# Patient Record
Sex: MALE | Race: BLACK OR AFRICAN AMERICAN | NOT HISPANIC OR LATINO | Employment: UNEMPLOYED | URBAN - METROPOLITAN AREA
[De-identification: names, ages, dates, MRNs, and addresses within clinical notes are randomized per-mention and may not be internally consistent; named-entity substitution may affect disease eponyms.]

---

## 2017-06-16 ENCOUNTER — APPOINTMENT (OUTPATIENT)
Dept: WOMENS IMAGING | Facility: HOSPITAL | Age: 27
End: 2017-06-16

## 2017-06-16 PROCEDURE — 73630 X-RAY EXAM OF FOOT: CPT | Performed by: RADIOLOGY

## 2019-06-20 ENCOUNTER — APPOINTMENT (OUTPATIENT)
Dept: GENERAL RADIOLOGY | Facility: HOSPITAL | Age: 29
End: 2019-06-20

## 2019-06-20 ENCOUNTER — HOSPITAL ENCOUNTER (EMERGENCY)
Facility: HOSPITAL | Age: 29
Discharge: HOME | End: 2019-06-20
Attending: EMERGENCY MEDICINE | Admitting: EMERGENCY MEDICINE

## 2019-06-20 VITALS
HEIGHT: 74 IN | BODY MASS INDEX: 24.81 KG/M2 | RESPIRATION RATE: 16 BRPM | DIASTOLIC BLOOD PRESSURE: 99 MMHG | OXYGEN SATURATION: 99 % | HEART RATE: 52 BPM | WEIGHT: 193.34 LBS | TEMPERATURE: 97.7 F | SYSTOLIC BLOOD PRESSURE: 135 MMHG

## 2019-06-20 DIAGNOSIS — S60.351D FOREIGN BODY OF RIGHT THUMB, SUBSEQUENT ENCOUNTER: ICD-10-CM

## 2019-06-20 DIAGNOSIS — M79.644 THUMB PAIN, RIGHT: Primary | ICD-10-CM

## 2019-06-20 PROCEDURE — 73130 X-RAY EXAM OF HAND: CPT

## 2019-06-20 PROCEDURE — 99283 EMERGENCY DEPT VISIT LOW MDM: CPT

## 2019-06-20 RX ORDER — CEPHALEXIN 500 MG/1
500 CAPSULE ORAL 3 TIMES DAILY
Qty: 12 CAPSULE | Refills: 0 | Status: SHIPPED | OUTPATIENT
Start: 2019-06-20 | End: 2019-06-24

## 2020-01-23 ENCOUNTER — APPOINTMENT (OUTPATIENT)
Dept: GENERAL RADIOLOGY | Facility: HOSPITAL | Age: 30
End: 2020-01-23

## 2020-01-23 ENCOUNTER — HOSPITAL ENCOUNTER (EMERGENCY)
Facility: HOSPITAL | Age: 30
Discharge: HOME | End: 2020-01-23
Admitting: EMERGENCY MEDICINE

## 2020-01-23 VITALS
TEMPERATURE: 98.8 F | BODY MASS INDEX: 26.26 KG/M2 | DIASTOLIC BLOOD PRESSURE: 76 MMHG | SYSTOLIC BLOOD PRESSURE: 131 MMHG | WEIGHT: 204.59 LBS | HEIGHT: 74 IN | OXYGEN SATURATION: 98 % | RESPIRATION RATE: 18 BRPM | HEART RATE: 76 BPM

## 2020-01-23 DIAGNOSIS — R50.9 FEVER, UNSPECIFIED FEVER CAUSE: ICD-10-CM

## 2020-01-23 DIAGNOSIS — J06.9 UPPER RESPIRATORY TRACT INFECTION, UNSPECIFIED TYPE: Primary | ICD-10-CM

## 2020-01-23 LAB
FLUAV SUBTYP SPEC NAA+PROBE: NOT DETECTED
FLUBV RNA ISLT QL NAA+PROBE: NOT DETECTED
S PYO AG THROAT QL: NEGATIVE

## 2020-01-23 PROCEDURE — 87502 INFLUENZA DNA AMP PROBE: CPT | Performed by: NURSE PRACTITIONER

## 2020-01-23 PROCEDURE — 99283 EMERGENCY DEPT VISIT LOW MDM: CPT

## 2020-01-23 PROCEDURE — 94640 AIRWAY INHALATION TREATMENT: CPT

## 2020-01-23 PROCEDURE — 87651 STREP A DNA AMP PROBE: CPT | Performed by: NURSE PRACTITIONER

## 2020-01-23 PROCEDURE — 71046 X-RAY EXAM CHEST 2 VIEWS: CPT

## 2020-01-23 RX ORDER — ALBUTEROL SULFATE 90 UG/1
2 AEROSOL, METERED RESPIRATORY (INHALATION) EVERY 6 HOURS PRN
Qty: 6.7 G | Refills: 0 | OUTPATIENT
Start: 2020-01-23 | End: 2022-08-09

## 2020-01-23 RX ORDER — IBUPROFEN 800 MG/1
800 TABLET ORAL EVERY 8 HOURS PRN
Qty: 9 TABLET | Refills: 0 | OUTPATIENT
Start: 2020-01-23 | End: 2022-08-09

## 2020-01-23 RX ORDER — IPRATROPIUM BROMIDE AND ALBUTEROL SULFATE 2.5; .5 MG/3ML; MG/3ML
3 SOLUTION RESPIRATORY (INHALATION) ONCE
Status: COMPLETED | OUTPATIENT
Start: 2020-01-23 | End: 2020-01-23

## 2020-01-23 RX ORDER — IPRATROPIUM BROMIDE AND ALBUTEROL SULFATE 2.5; .5 MG/3ML; MG/3ML
3 SOLUTION RESPIRATORY (INHALATION) EVERY 4 HOURS PRN
Qty: 90 ML | Refills: 0 | OUTPATIENT
Start: 2020-01-23 | End: 2022-08-09

## 2020-01-23 RX ORDER — BENZONATATE 100 MG/1
100 CAPSULE ORAL 3 TIMES DAILY PRN
Qty: 30 CAPSULE | Refills: 0 | OUTPATIENT
Start: 2020-01-23 | End: 2022-08-09

## 2020-01-23 RX ADMIN — IPRATROPIUM BROMIDE AND ALBUTEROL SULFATE 3 ML: .5; 3 SOLUTION RESPIRATORY (INHALATION) at 22:50

## 2020-01-24 NOTE — DISCHARGE INSTRUCTIONS
medications as directed. plenty of fluids. Tylenol as needed.  Follow up with your family doctor the [facility] next week.  Return for any new or worsening symptoms. May use warm saltwater gargles or Chloraseptic spray throat lozenges for sore throat. May use saline rinses or neti pots for congestion

## 2020-01-24 NOTE — ED NOTES
Pt c/o body aches, vomiting, fever (yesterday 101.2), sore throat, running nose and congestion since Tuesday evening.        Niurka Merida, JUAN JOSE  01/23/20 2416

## 2020-01-24 NOTE — ED PROVIDER NOTES
Subjective   Chief complaint: Congestion      Context: Patient is a 29-year-old male who comes in complaining of cough and congestion for the last couple days.  He states he had a fever yesterday.  Has had postnasal drainage.  Denies any vomiting diarrhea rash or stiff neck.  Has also had a sore throat.  Denies any decrease in intake output vomiting or diarrhea.    Duration: Couple days    Timing: Waxes and wanes    Severity: Moderate    Associated symptoms: Ran out of his albuterol solution a couple days ago          PCP:  None            Review of Systems   Constitutional: Positive for fever.   HENT: Positive for congestion, postnasal drip, rhinorrhea and sore throat.    Eyes: Negative.    Respiratory: Positive for cough and wheezing.    Cardiovascular: Negative.    Gastrointestinal: Negative.    Genitourinary: Negative.    Musculoskeletal: Negative.    Skin: Negative.    Hematological: Negative.        Past Medical History:   Diagnosis Date   • Asthma        No Known Allergies    No past surgical history on file.    No family history on file.    Social History     Socioeconomic History   • Marital status: Single     Spouse name: Not on file   • Number of children: Not on file   • Years of education: Not on file   • Highest education level: Not on file   Tobacco Use   • Smoking status: Never Smoker   • Smokeless tobacco: Never Used   Substance and Sexual Activity   • Alcohol use: Yes     Alcohol/week: 12.0 standard drinks     Types: 12 Cans of beer per week   • Drug use: No   • Sexual activity: Defer           Objective   Physical Exam     Vital signs and triage nurse note reviewed.   Constitutional: Awake, alert; well-developed and well-nourished. No acute distress is noted.   HEENT: Normocephalic, atraumatic; pupils are PERRL with intact EOM; oropharynx is pink and moist without exudate or erythema. Mild congestion with clear rhinorrhea.  No change in phonation difficulty nor secretions or speaking full  sentences.  No pain over the frontal or maxillary sinuses.  Neck: Supple, full range of motion without pain; no cervical lymphadenopathy.   Cardiovascular: Regular rate and rhythm, normal S1-S2.   Pulmonary: Respiratory effort regular nonlabored, breath sounds expiratory wheezes bilateral lower lobes   Abdomen: Soft, nontender nondistended with normoactive bowel sounds; no rebound or guarding.   Musculoskeletal: Independent range of motion of all extremities with no palpable tenderness or edema.   Neuro: Alert oriented x3, speech is clear and appropriate, GCS 15   Skin:  Fleshtone warm, dry, intact; no erythematous or petechial rash or lesion       Procedures           ED Course      Labs Reviewed   INFLUENZA ANTIGEN, RAPID - Normal   RAPID STREP A SCREEN - Normal     Medications   ipratropium-albuterol (DUO-NEB) nebulizer solution 3 mL (3 mL Nebulization Given 1/23/20 2250)     Xr Chest 2 View    Result Date: 1/23/2020  Impression:  1. No evidence of active disease.  Electronically Signed ByGreg Salas On:1/23/2020 11:25 PM This report was finalized on [id number] by  April Salas .                                             MDM  Number of Diagnoses or Management Options  Fever, unspecified fever cause:   Upper respiratory tract infection, unspecified type:   Diagnosis management comments: Medical decision  Comorbidities:  has a past medical history of Asthma.  Differentials: Flu strep pneumonia virus  Radiology interpretation:  X-rays reviewed by me and interpreted by radiologist,   Xr Chest 2 View    Result Date: 1/23/2020  Impression:  1. No evidence of active disease.  Electronically Signed ByGreg Salas On:1/23/2020 11:25 PM This report was finalized on [id number] by  April Salas .    Lab interpretation:  Labs viewed by me significant for, negative  i discussed findings with patient who voices understanding of discharge instructions, signs and symptoms requiring return to ED; discharged improved  and in stable condition with follow up for re-evaluation.  Charged home with Tessalon and pro-air ibuprofen and DuoNeb solution      Final diagnoses:   Upper respiratory tract infection, unspecified type   Fever, unspecified fever cause            Andreea Howell, APRN  01/23/20 4359

## 2020-07-31 ENCOUNTER — HOSPITAL ENCOUNTER (EMERGENCY)
Facility: HOSPITAL | Age: 30
Discharge: HOME | End: 2020-07-31
Admitting: EMERGENCY MEDICINE

## 2020-07-31 VITALS
SYSTOLIC BLOOD PRESSURE: 117 MMHG | HEIGHT: 74 IN | DIASTOLIC BLOOD PRESSURE: 67 MMHG | BODY MASS INDEX: 27.72 KG/M2 | TEMPERATURE: 98.2 F | RESPIRATION RATE: 15 BRPM | WEIGHT: 216 LBS | OXYGEN SATURATION: 99 % | HEART RATE: 79 BPM

## 2020-07-31 DIAGNOSIS — R31.9 URINARY TRACT INFECTION WITH HEMATURIA, SITE UNSPECIFIED: ICD-10-CM

## 2020-07-31 DIAGNOSIS — N39.0 URINARY TRACT INFECTION WITH HEMATURIA, SITE UNSPECIFIED: ICD-10-CM

## 2020-07-31 DIAGNOSIS — R11.2 NAUSEA AND VOMITING, INTRACTABILITY OF VOMITING NOT SPECIFIED, UNSPECIFIED VOMITING TYPE: Primary | ICD-10-CM

## 2020-07-31 LAB
ALBUMIN SERPL-MCNC: 4.5 G/DL (ref 3.5–5.2)
ALBUMIN/GLOB SERPL: 1.5 G/DL
ALP SERPL-CCNC: 69 U/L (ref 39–117)
ALT SERPL W P-5'-P-CCNC: 35 U/L (ref 1–41)
ANION GAP SERPL CALCULATED.3IONS-SCNC: 13 MMOL/L (ref 5–15)
AST SERPL-CCNC: 32 U/L (ref 1–40)
BACTERIA UR QL AUTO: ABNORMAL /HPF
BASOPHILS # BLD AUTO: 0 10*3/MM3 (ref 0–0.2)
BASOPHILS NFR BLD AUTO: 0.6 % (ref 0–1.5)
BILIRUB SERPL-MCNC: 0.4 MG/DL (ref 0–1.2)
BILIRUB UR QL STRIP: NEGATIVE
BUN SERPL-MCNC: 4 MG/DL (ref 6–20)
BUN SERPL-MCNC: NORMAL MG/DL
BUN/CREAT SERPL: NORMAL
CALCIUM SPEC-SCNC: 9.2 MG/DL (ref 8.6–10.5)
CHLORIDE SERPL-SCNC: 104 MMOL/L (ref 98–107)
CLARITY UR: CLEAR
CO2 SERPL-SCNC: 24 MMOL/L (ref 22–29)
COLOR UR: YELLOW
CREAT SERPL-MCNC: 1.27 MG/DL (ref 0.76–1.27)
DEPRECATED RDW RBC AUTO: 42.4 FL (ref 37–54)
EOSINOPHIL # BLD AUTO: 0.3 10*3/MM3 (ref 0–0.4)
EOSINOPHIL NFR BLD AUTO: 3.5 % (ref 0.3–6.2)
ERYTHROCYTE [DISTWIDTH] IN BLOOD BY AUTOMATED COUNT: 14 % (ref 12.3–15.4)
GFR SERPL CREATININE-BSD FRML MDRD: 80 ML/MIN/1.73
GLOBULIN UR ELPH-MCNC: 3.1 GM/DL
GLUCOSE SERPL-MCNC: 95 MG/DL (ref 65–99)
GLUCOSE UR STRIP-MCNC: NEGATIVE MG/DL
HCT VFR BLD AUTO: 45.5 % (ref 37.5–51)
HGB BLD-MCNC: 15 G/DL (ref 13–17.7)
HGB UR QL STRIP.AUTO: ABNORMAL
HYALINE CASTS UR QL AUTO: ABNORMAL /LPF
KETONES UR QL STRIP: NEGATIVE
LEUKOCYTE ESTERASE UR QL STRIP.AUTO: ABNORMAL
LIPASE SERPL-CCNC: 119 U/L (ref 13–60)
LYMPHOCYTES # BLD AUTO: 2.8 10*3/MM3 (ref 0.7–3.1)
LYMPHOCYTES NFR BLD AUTO: 38.7 % (ref 19.6–45.3)
MCH RBC QN AUTO: 29 PG (ref 26.6–33)
MCHC RBC AUTO-ENTMCNC: 32.9 G/DL (ref 31.5–35.7)
MCV RBC AUTO: 88.2 FL (ref 79–97)
MONOCYTES # BLD AUTO: 0.6 10*3/MM3 (ref 0.1–0.9)
MONOCYTES NFR BLD AUTO: 8.8 % (ref 5–12)
NEUTROPHILS NFR BLD AUTO: 3.5 10*3/MM3 (ref 1.7–7)
NEUTROPHILS NFR BLD AUTO: 48.4 % (ref 42.7–76)
NITRITE UR QL STRIP: NEGATIVE
NRBC BLD AUTO-RTO: 0.2 /100 WBC (ref 0–0.2)
PH UR STRIP.AUTO: 6 [PH] (ref 5–8)
PLATELET # BLD AUTO: 298 10*3/MM3 (ref 140–450)
PMV BLD AUTO: 8.5 FL (ref 6–12)
POTASSIUM SERPL-SCNC: 4.6 MMOL/L (ref 3.5–5.2)
PROT SERPL-MCNC: 7.6 G/DL (ref 6–8.5)
PROT UR QL STRIP: NEGATIVE
RBC # BLD AUTO: 5.16 10*6/MM3 (ref 4.14–5.8)
RBC # UR: ABNORMAL /HPF
REF LAB TEST METHOD: ABNORMAL
SODIUM SERPL-SCNC: 141 MMOL/L (ref 136–145)
SP GR UR STRIP: 1.02 (ref 1–1.03)
SQUAMOUS #/AREA URNS HPF: ABNORMAL /HPF
UROBILINOGEN UR QL STRIP: ABNORMAL
WBC # BLD AUTO: 7.1 10*3/MM3 (ref 3.4–10.8)
WBC UR QL AUTO: ABNORMAL /HPF

## 2020-07-31 PROCEDURE — 99283 EMERGENCY DEPT VISIT LOW MDM: CPT

## 2020-07-31 PROCEDURE — 81001 URINALYSIS AUTO W/SCOPE: CPT | Performed by: PHYSICIAN ASSISTANT

## 2020-07-31 PROCEDURE — 96374 THER/PROPH/DIAG INJ IV PUSH: CPT

## 2020-07-31 PROCEDURE — 85025 COMPLETE CBC W/AUTO DIFF WBC: CPT | Performed by: PHYSICIAN ASSISTANT

## 2020-07-31 PROCEDURE — 80053 COMPREHEN METABOLIC PANEL: CPT | Performed by: PHYSICIAN ASSISTANT

## 2020-07-31 PROCEDURE — 25010000002 ONDANSETRON PER 1 MG: Performed by: PHYSICIAN ASSISTANT

## 2020-07-31 PROCEDURE — 87086 URINE CULTURE/COLONY COUNT: CPT | Performed by: PHYSICIAN ASSISTANT

## 2020-07-31 PROCEDURE — 83690 ASSAY OF LIPASE: CPT | Performed by: PHYSICIAN ASSISTANT

## 2020-07-31 PROCEDURE — 96361 HYDRATE IV INFUSION ADD-ON: CPT

## 2020-07-31 RX ORDER — ONDANSETRON 4 MG/1
4 TABLET, ORALLY DISINTEGRATING ORAL EVERY 4 HOURS PRN
Qty: 10 TABLET | Refills: 0 | OUTPATIENT
Start: 2020-07-31 | End: 2022-08-09

## 2020-07-31 RX ORDER — CEFDINIR 300 MG/1
300 CAPSULE ORAL 2 TIMES DAILY
Qty: 14 CAPSULE | Refills: 0 | OUTPATIENT
Start: 2020-07-31 | End: 2022-08-09

## 2020-07-31 RX ORDER — SODIUM CHLORIDE 0.9 % (FLUSH) 0.9 %
10 SYRINGE (ML) INJECTION AS NEEDED
Status: DISCONTINUED | OUTPATIENT
Start: 2020-07-31 | End: 2020-07-31 | Stop reason: HOSPADM

## 2020-07-31 RX ORDER — ONDANSETRON 2 MG/ML
4 INJECTION INTRAMUSCULAR; INTRAVENOUS ONCE
Status: COMPLETED | OUTPATIENT
Start: 2020-07-31 | End: 2020-07-31

## 2020-07-31 RX ADMIN — ONDANSETRON 4 MG: 2 INJECTION INTRAMUSCULAR; INTRAVENOUS at 14:46

## 2020-07-31 RX ADMIN — SODIUM CHLORIDE 1000 ML: 900 INJECTION, SOLUTION INTRAVENOUS at 14:47

## 2020-08-01 LAB — BACTERIA SPEC AEROBE CULT: NO GROWTH

## 2020-09-21 ENCOUNTER — APPOINTMENT (OUTPATIENT)
Dept: GENERAL RADIOLOGY | Facility: HOSPITAL | Age: 30
End: 2020-09-21

## 2020-09-21 ENCOUNTER — HOSPITAL ENCOUNTER (EMERGENCY)
Facility: HOSPITAL | Age: 30
Discharge: HOME | End: 2020-09-21
Admitting: EMERGENCY MEDICINE

## 2020-09-21 VITALS
HEART RATE: 91 BPM | OXYGEN SATURATION: 99 % | WEIGHT: 220 LBS | BODY MASS INDEX: 28.23 KG/M2 | HEIGHT: 74 IN | RESPIRATION RATE: 16 BRPM | TEMPERATURE: 98 F | DIASTOLIC BLOOD PRESSURE: 69 MMHG | SYSTOLIC BLOOD PRESSURE: 129 MMHG

## 2020-09-21 DIAGNOSIS — R93.7 ABNORMAL BONE XRAY: ICD-10-CM

## 2020-09-21 DIAGNOSIS — M25.572 ACUTE LEFT ANKLE PAIN: Primary | ICD-10-CM

## 2020-09-21 PROCEDURE — 73610 X-RAY EXAM OF ANKLE: CPT

## 2020-09-21 PROCEDURE — 99283 EMERGENCY DEPT VISIT LOW MDM: CPT

## 2020-09-21 RX ORDER — HYDROCODONE BITARTRATE AND ACETAMINOPHEN 7.5; 325 MG/1; MG/1
1 TABLET ORAL ONCE
Status: COMPLETED | OUTPATIENT
Start: 2020-09-21 | End: 2020-09-21

## 2020-09-21 RX ORDER — TRAMADOL HYDROCHLORIDE 50 MG/1
50 TABLET ORAL EVERY 6 HOURS PRN
Qty: 11 TABLET | Refills: 0 | OUTPATIENT
Start: 2020-09-21 | End: 2022-08-09

## 2020-09-21 RX ADMIN — HYDROCODONE BITARTRATE AND ACETAMINOPHEN 1 TABLET: 7.5; 325 TABLET ORAL at 09:24

## 2020-09-21 NOTE — DISCHARGE INSTRUCTIONS
Discharge home.  May use ice 20 minutes at a time several times a day for discomfort and swelling also keep ankle elevated at level of heart to help with swelling.  Use walking boot and crutches until seen by Orthopaedic doctor, Dr. Bermudez.  May take tramadol for pain as needed.  Do not drink drive or operate heavy machinery with pain medication.

## 2020-09-21 NOTE — ED NOTES
C/O left ankle pain after playing basketball lastnight, felt a pop while playing. Pt reports he is unable to put weight on left foot.      Lisa Aldrich RN  09/21/20 1491

## 2020-09-21 NOTE — ED PROVIDER NOTES
Subjective   History: Patient is a 30-year-old male complains of left ankle pain.  He states yesterday he was playing basketball when he heard a pop to his left ankle.  Reports he was able to walk yesterday but then woke up this a.m. unable to bear weight on his left ankle.  States pain goes up his leg towards his calf.  Denies taking any pain medication for ankle pain.      Onset: 1 day  Location: Left ankle  Duration: Waxes and wanes  Character: Sharp and ache  Aggravating/Alleviating factors: Walking makes it worse  Radiation left calf  Severity: Moderate            Review of Systems   Constitutional: Negative for fever.   Respiratory: Negative for shortness of breath.    Cardiovascular: Negative for chest pain.   Gastrointestinal: Negative for nausea and vomiting.   Musculoskeletal: Positive for arthralgias and joint swelling.   Skin: Negative for color change, rash and wound.   Psychiatric/Behavioral: Negative for self-injury.       Past Medical History:   Diagnosis Date   • Asthma        No Known Allergies    No past surgical history on file.    No family history on file.    Social History     Socioeconomic History   • Marital status: Single     Spouse name: Not on file   • Number of children: Not on file   • Years of education: Not on file   • Highest education level: Not on file   Tobacco Use   • Smoking status: Never Smoker   • Smokeless tobacco: Never Used   Substance and Sexual Activity   • Alcohol use: Yes     Alcohol/week: 12.0 standard drinks     Types: 12 Cans of beer per week   • Drug use: No   • Sexual activity: Defer           Objective   Physical Exam  Constitutional:       Appearance: Normal appearance.   HENT:      Head: Normocephalic.   Eyes:      Pupils: Pupils are equal, round, and reactive to light.   Cardiovascular:      Rate and Rhythm: Normal rate.      Heart sounds: Normal heart sounds. No murmur.   Pulmonary:      Effort: Pulmonary effort is normal.      Breath sounds: Normal breath  "sounds.   Musculoskeletal:         General: Swelling and tenderness present. No deformity.      Comments: Mild edema left foot and ankle, 2+ dorsalis pedis pulse palpable.  Positive left Blake test and tender to palpation over Achilles region.  Range of motion to left ankle limited due to pain.  House arrest bracelet present to left ankle.  No deformity or crepitus no erythema warmth ecchymosis or open wounds present.   Skin:     General: Skin is warm and dry.      Capillary Refill: Capillary refill takes less than 2 seconds.      Findings: No bruising, erythema or rash.   Neurological:      Mental Status: He is alert and oriented to person, place, and time.   Psychiatric:         Mood and Affect: Mood normal.         Behavior: Behavior normal.         Thought Content: Thought content normal.         Judgment: Judgment normal.         Procedures           ED Course    /81 (Patient Position: Sitting)   Pulse 88   Temp 97.9 °F (36.6 °C) (Oral)   Resp 14   Ht 188 cm (74\")   Wt 99.8 kg (220 lb)   SpO2 97%   BMI 28.25 kg/m²   Labs Reviewed - No data to display  Medications   HYDROcodone-acetaminophen (NORCO) 7.5-325 MG per tablet 1 tablet (1 tablet Oral Given 9/21/20 0924)     Xr Ankle 3+ View Left    Result Date: 9/21/2020  1. No acute osseous abnormality. 2. Small well-corticated osseous body adjacent to the tip of the medial malleolus likely related to remote trauma.  Electronically Signed By-Ramin Kearns On:9/21/2020 9:38 AM This report was finalized on [id number] by  Ramin Kearns, .                                               [facility]     Inspect queried.  Walking boot and crutches given to patient upon discharge.  Small osseous fragment noted on ankle x-ray likely related to recent trauma without acute fracture or dislocation.  Patient had positive Blake test with edema to ankle needs Ortho consult to rule out partial or complete tear of Achilles tendon.  On-call orthopedic Dr. Bermudez name " and number given to patient on discharge.    Patient had house arrest placed to left ankle, [facility]'s department was contacted and approval given to remove ankle bracelet but to remain with patient on his person until another bracelet is able to be placed ASAP.  Removal of bracelet was needed for placement of walking boot.    Prescription: Tramadol    Final diagnoses:   Acute left ankle pain   Abnormal bone xray            Adriana Wheatley, APRN  09/21/20 1049

## 2022-08-09 ENCOUNTER — HOSPITAL ENCOUNTER (EMERGENCY)
Facility: HOSPITAL | Age: 32
Discharge: HOME | End: 2022-08-09
Attending: EMERGENCY MEDICINE | Admitting: EMERGENCY MEDICINE

## 2022-08-09 VITALS
SYSTOLIC BLOOD PRESSURE: 132 MMHG | TEMPERATURE: 98.8 F | WEIGHT: 204.2 LBS | HEIGHT: 74 IN | RESPIRATION RATE: 18 BRPM | HEART RATE: 108 BPM | BODY MASS INDEX: 26.21 KG/M2 | DIASTOLIC BLOOD PRESSURE: 81 MMHG | OXYGEN SATURATION: 99 %

## 2022-08-09 DIAGNOSIS — M25.50 ARTHRALGIA, UNSPECIFIED JOINT: ICD-10-CM

## 2022-08-09 DIAGNOSIS — U07.1 COVID: Primary | ICD-10-CM

## 2022-08-09 LAB
B PARAPERT DNA SPEC QL NAA+PROBE: NOT DETECTED
B PERT DNA SPEC QL NAA+PROBE: NOT DETECTED
C PNEUM DNA NPH QL NAA+NON-PROBE: NOT DETECTED
FLUAV SUBTYP SPEC NAA+PROBE: NOT DETECTED
FLUBV RNA ISLT QL NAA+PROBE: NOT DETECTED
HADV DNA SPEC NAA+PROBE: NOT DETECTED
HCOV 229E RNA SPEC QL NAA+PROBE: NOT DETECTED
HCOV HKU1 RNA SPEC QL NAA+PROBE: NOT DETECTED
HCOV NL63 RNA SPEC QL NAA+PROBE: NOT DETECTED
HCOV OC43 RNA SPEC QL NAA+PROBE: NOT DETECTED
HMPV RNA NPH QL NAA+NON-PROBE: NOT DETECTED
HPIV1 RNA ISLT QL NAA+PROBE: NOT DETECTED
HPIV2 RNA SPEC QL NAA+PROBE: NOT DETECTED
HPIV3 RNA NPH QL NAA+PROBE: NOT DETECTED
HPIV4 P GENE NPH QL NAA+PROBE: NOT DETECTED
M PNEUMO IGG SER IA-ACNC: NOT DETECTED
RHINOVIRUS RNA SPEC NAA+PROBE: NOT DETECTED
RSV RNA NPH QL NAA+NON-PROBE: NOT DETECTED
SARS-COV-2 RNA NPH QL NAA+NON-PROBE: DETECTED

## 2022-08-09 PROCEDURE — 0202U NFCT DS 22 TRGT SARS-COV-2: CPT | Performed by: EMERGENCY MEDICINE

## 2022-08-09 PROCEDURE — 99283 EMERGENCY DEPT VISIT LOW MDM: CPT

## 2022-08-09 RX ORDER — METHOCARBAMOL 500 MG/1
500 TABLET, FILM COATED ORAL ONCE
Status: COMPLETED | OUTPATIENT
Start: 2022-08-09 | End: 2022-08-09

## 2022-08-09 RX ORDER — ACETAMINOPHEN 500 MG
1000 TABLET ORAL ONCE
Status: COMPLETED | OUTPATIENT
Start: 2022-08-09 | End: 2022-08-09

## 2022-08-09 RX ORDER — METHOCARBAMOL 500 MG/1
500 TABLET, FILM COATED ORAL 4 TIMES DAILY
Qty: 20 TABLET | Refills: 0 | Status: SHIPPED | OUTPATIENT
Start: 2022-08-09

## 2022-08-09 RX ORDER — IBUPROFEN 600 MG/1
600 TABLET ORAL ONCE
Status: COMPLETED | OUTPATIENT
Start: 2022-08-09 | End: 2022-08-09

## 2022-08-09 RX ORDER — DICLOFENAC SODIUM 75 MG/1
75 TABLET, DELAYED RELEASE ORAL 2 TIMES DAILY PRN
Qty: 20 TABLET | Refills: 0 | Status: SHIPPED | OUTPATIENT
Start: 2022-08-09

## 2022-08-09 RX ADMIN — IBUPROFEN 600 MG: 600 TABLET ORAL at 05:23

## 2022-08-09 RX ADMIN — METHOCARBAMOL 500 MG: 500 TABLET ORAL at 05:23

## 2022-08-09 RX ADMIN — ACETAMINOPHEN 1000 MG: 500 TABLET ORAL at 05:23

## 2022-08-09 NOTE — ED PROVIDER NOTES
Subjective   Patient is a 31-year-old man who began feeling bad yesterday he states he has body aches and pains he has had no fever no chills he states the pain is concentrated in his low back.  He did not take anything for discomfort he has had no COVID exposure that he is aware of.  He rates his pain a 7/10 he states it is a dull aching pain that hurts all over but mainly in his low back.  He has had no injury no falls he has no loss of bowel or bladder control no saddle anesthesia no difficulty urinating          Review of Systems   Constitutional: Negative for chills, fatigue and fever.   HENT: Negative for congestion, tinnitus and trouble swallowing.    Eyes: Negative for photophobia, discharge and redness.   Respiratory: Negative for cough and shortness of breath.    Cardiovascular: Negative for chest pain and palpitations.   Gastrointestinal: Negative for abdominal pain, diarrhea, nausea and vomiting.   Genitourinary: Negative for dysuria, frequency and urgency.   Musculoskeletal: Positive for arthralgias and back pain. Negative for joint swelling and myalgias.   Skin: Negative for rash.   Neurological: Negative for dizziness and headaches.   Psychiatric/Behavioral: Negative for confusion.   All other systems reviewed and are negative.      Past Medical History:   Diagnosis Date   • Asthma        No Known Allergies    History reviewed. No pertinent surgical history.    History reviewed. No pertinent family history.    Social History     Socioeconomic History   • Marital status: Single   Tobacco Use   • Smoking status: Never Smoker   • Smokeless tobacco: Never Used   Substance and Sexual Activity   • Alcohol use: Yes     Alcohol/week: 12.0 standard drinks     Types: 12 Cans of beer per week   • Drug use: No   • Sexual activity: Defer           Objective   Physical Exam  Vitals reviewed.   Constitutional:       General: He is not in acute distress.     Appearance: Normal appearance. He is well-developed. He is  "obese. He is not ill-appearing, toxic-appearing or diaphoretic.   HENT:      Head: Normocephalic and atraumatic.      Right Ear: External ear normal.      Left Ear: External ear normal.      Nose: Nose normal.      Mouth/Throat:      Mouth: Mucous membranes are moist.   Eyes:      Conjunctiva/sclera: Conjunctivae normal.      Pupils: Pupils are equal, round, and reactive to light.   Cardiovascular:      Rate and Rhythm: Regular rhythm. Tachycardia present.      Pulses: Normal pulses.      Heart sounds: Normal heart sounds.   Pulmonary:      Effort: Pulmonary effort is normal.      Breath sounds: Normal breath sounds.   Abdominal:      General: Bowel sounds are normal.      Palpations: Abdomen is soft.   Musculoskeletal:         General: Normal range of motion.      Cervical back: Normal range of motion. Rigidity present. No tenderness.   Skin:     General: Skin is warm and dry.      Capillary Refill: Capillary refill takes less than 2 seconds.   Neurological:      General: No focal deficit present.      Mental Status: He is alert and oriented to person, place, and time.      GCS: GCS eye subscore is 4. GCS verbal subscore is 5. GCS motor subscore is 6.      Cranial Nerves: Cranial nerves are intact.      Motor: Motor function is intact.      Coordination: Coordination is intact.      Deep Tendon Reflexes: Reflexes are normal and symmetric.   Psychiatric:         Attention and Perception: Attention and perception normal.         Mood and Affect: Mood and affect normal.         Speech: Speech normal.         Behavior: Behavior normal. Behavior is cooperative.         Cognition and Memory: Cognition and memory normal.         Procedures           ED Course      /82 (BP Location: Left arm, Patient Position: Sitting)   Pulse 107   Temp 99 °F (37.2 °C)   Resp 17   Ht 188 cm (74\")   Wt 92.6 kg (204 lb 3.2 oz)   SpO2 97%   BMI 26.22 kg/m²   Labs Reviewed   RESPIRATORY PANEL PCR W/ COVID-19 (SARS-COV-2) " PAUL/MARCELLUS/JAYJAY/PAD/COR/MAD/ELENA IN-HOUSE, NP SWAB IN UTM/VTP, 3-4 HR TAT - Abnormal; Notable for the following components:       Result Value    COVID19 Detected (*)     All other components within normal limits    Narrative:     In the setting of a positive respiratory panel with a viral infection PLUS a negative procalcitonin without other underlying concern for bacterial infection, consider observing off antibiotics or discontinuation of antibiotics and continue supportive care. If the respiratory panel is positive for atypical bacterial infection (Bordetella pertussis, Chlamydophila pneumoniae, or Mycoplasma pneumoniae), consider antibiotic de-escalation to target atypical bacterial infection.   COVID-19,CEPHEID/ÁNGELA,COR/JAYJAY/PAD/THIERRY IN-HOUSE,NP SWAB IN TRANSPORT MEDIA 3-4 HR TAT, RT-PCR     Medications   ibuprofen (ADVIL,MOTRIN) tablet 600 mg (has no administration in time range)   acetaminophen (TYLENOL) tablet 1,000 mg (has no administration in time range)   methocarbamol (ROBAXIN) tablet 500 mg (has no administration in time range)     No radiology results for the last day                                       MDM  Number of Diagnoses or Management Options  Arthralgia, unspecified joint  COVID  Diagnosis management comments: Patient had above exam and was given Tylenol Motrin and Robaxin for discomfort.  He was identified as having COVID-19.  He was told to quarantine for the next 5 days he was given Voltaren for discomfort he was advised not to mix with Motrin ibuprofen Advil or Aleve    He was advised to return if worse he stated he did not need a work note.       Amount and/or Complexity of Data Reviewed  Clinical lab tests: reviewed    Risk of Complications, Morbidity, and/or Mortality  Presenting problems: high  Diagnostic procedures: high  Management options: high    Patient Progress  Patient progress: improved      Final diagnoses:   COVID   Arthralgia, unspecified joint       ED Disposition  ED  Disposition     ED Disposition   Discharge    Condition   Stable    Comment   --             Hayden Madsen MD  [address]  [place] [address]  [phone]    In 5 days      Matthew Lilly MD  [address] [facility] [place] [address]  [phone]    In 3 days  If symptoms worsen, As needed         Medication List      New Prescriptions    diclofenac 75 MG EC tablet  Commonly known as: VOLTAREN  Take 1 tablet by mouth 2 (Two) Times a Day As Needed (pain).     methocarbamol 500 MG tablet  Commonly known as: ROBAXIN  Take 1 tablet by mouth 4 (Four) Times a Day.        Stop    albuterol sulfate  (90 Base) MCG/ACT inhaler  Commonly known as: PROVENTIL HFA;VENTOLIN HFA;PROAIR HFA     benzonatate 100 MG capsule  Commonly known as: TESSALON     cefdinir 300 MG capsule  Commonly known as: OMNICEF     ibuprofen 800 MG tablet  Commonly known as: ADVIL,MOTRIN     ipratropium-albuterol 0.5-2.5 mg/3 ml nebulizer  Commonly known as: DUO-NEB     ondansetron ODT 4 MG disintegrating tablet  Commonly known as: ZOFRAN-ODT     traMADol 50 MG tablet  Commonly known as: ULTRAM           Where to Get Your Medications      You can get these medications from any pharmacy    Bring a paper prescription for each of these medications  · diclofenac 75 MG EC tablet  · methocarbamol 500 MG tablet          Lisa Howell, APRN  08/09/22 0450

## 2022-08-09 NOTE — DISCHARGE INSTRUCTIONS
Quarantine for the next 5 days    Push clear liquids    Use Voltaren as needed for discomfort do not mix with Motrin ibuprofen Advil or Aleve    Follow-up with primary care for any further problems return to the ER if worse